# Patient Record
Sex: FEMALE | Race: WHITE | NOT HISPANIC OR LATINO | Employment: STUDENT | URBAN - METROPOLITAN AREA
[De-identification: names, ages, dates, MRNs, and addresses within clinical notes are randomized per-mention and may not be internally consistent; named-entity substitution may affect disease eponyms.]

---

## 2017-08-18 ENCOUNTER — GENERIC CONVERSION - ENCOUNTER (OUTPATIENT)
Dept: OTHER | Facility: OTHER | Age: 22
End: 2017-08-18

## 2017-08-21 ENCOUNTER — GENERIC CONVERSION - ENCOUNTER (OUTPATIENT)
Dept: OTHER | Facility: OTHER | Age: 22
End: 2017-08-21

## 2018-01-16 NOTE — MISCELLANEOUS
Message   Recorded as Task   Date: 08/21/2017 10:28 AM, Created By: System   Task Name: Rx Renew Request   Assigned To: Oral Mayorga   Regarding Patient: Reed Yoder, Status: In Progress   Comment:    System - 21 Aug 2017 10:28 AM     PHARMACY: [id number] [facility]  PATIENT: Reed Yoder  MEDICATION: BLISOVI FE 1-20 TABLET   Cristina Salmeron - 21 Aug 2017 10:34 AM     TASK REASSIGNED: Previously Assigned To Phillip Almonte - 21 Aug 2017 10:34 AM     TASK IN PROGRESS   Meghan Suarez - 21 Aug 2017 11:14 AM     TASK EDITED  Patient should have enough B/C to last her till Dec 2017  LMOM to cb       ext: Clorinda Rinne - 21 Aug 2017 11:19 AM     TASK EDITED  Patient returned call - she has birth control to last her till Dec         Active Problems    1  Concussion (850 9) (S06 0X9A)   2  Encounter for gynecological examination without abnormal finding (V72 31) (Z01 419)   3  Menorrhagia (626 2) (N92 0)   4  Menorrhagia with regular cycle (626 2) (N92 0)   5  Screening for STD (sexually transmitted disease) (V74 5) (Z11 3)    Current Meds   1  Junel FE 1/20 1-20 MG-MCG Oral Tablet; Take 1 tablet daily; Therapy: 80YNH2673 to [phone])  Requested for: 01PKN3549; Last   Rx:[id number] Ordered   2  Norethin Ace-Eth Estrad-FE 1-20 MG-MCG Oral Tablet; take one pill daily continuously   for 3 cycles, then allow a withdrawal bleed; Therapy: 02ZEA3716 to (Evaluate:70Yst0972)  Requested for: 64Ozc0662; Last   Rx:[id number] Ordered    Allergies    1   No Known Drug Allergies    Signatures   Electronically signed by : Wayne Grider, ; Aug 21 2017 11:19AM EST                       (Author)

## 2018-01-18 NOTE — MISCELLANEOUS
Message   Recorded as Task   Date: 05/24/2016 12:29 PM, Created By: Glenna Lora   Task Name: Call Back   Assigned To: Theo Pagan   Regarding Patient: Karis Han, Status: In Progress   Comment:    Adia Arevalo - 24 May 2016 12:29 PM     TASK CREATED  Caller: Chema Jacob, Mother; Other  pls call mom Arlene Hua, rx is working well for daughter & wants to know if more is available & if a appt is needed with K87,  PLS CALL mom @ [phone]   Joyce Villagran - 24 May 2016 12:39 PM     TASK IN PROGRESS   Joyce Villagran - 24 May 2016 12:39 PM     TASK EDITED  lm for mother tcb   Tina Dear - 25 May 2016 10:20 AM     TASK EDITED  Mom wants cb after 1 at [address] [place] [address] [facility] [employer] - 25 May 2016 1:09 PM     TASK REPLIED TO: Previously Assigned To Miranda Hunter Fe is working well  Can pt have a refill? Tina Dear - 25 May 2016 1:12 PM     TASK EDITED  lmtcb on Moms cell  Previous # incorrect  Call Arlene Hua at [phone]   Tina Dear - 25 May 2016 1:13 PM     TASK EDITED   Tina Dear - 25 May 2016 2:09 PM     TASK EDITED  Pts pills refilled and [facility] given [facility]   Tina Dear - 25 May 2016 2:09 PM     TASK EDITED  Pt given yly appt        Active Problems    1  Concussion (850 9) (S06 0X9A)   2  Menorrhagia (626 2) (N92 0)   3  Menorrhagia with regular cycle (626 2) (N92 0)    Current Meds   1  Junel FE 1/20 1-20 MG-MCG Oral Tablet; take 1 tablet every day; Therapy: 73CNW2191 to (Evaluate:08Mar2016); Last Rx:07Apr2015 Ordered   2  Norethin Ace-Eth Estrad-FE 1-20 MG-MCG Oral Tablet; take one pill daily continuously   for 3 cycles, then allow a withdrawal bleed; Therapy: 67IPH4394 to (Evaluate:06Oct2016); Last Rx:12Oct2015 Ordered    Allergies    1   No Known Drug Allergies    Plan  Menorrhagia with regular cycle    · Norethin Ace-Eth Estrad-FE 1-20 MG-MCG Oral Tablet; take one pill daily  continuously for 3 cycles, then allow a withdrawal bleed    Signatures Electronically signed by :  Alcon Duran, ; May 25 2016  2:09PM EST                       (Author)

## 2018-01-18 NOTE — MISCELLANEOUS
Message   Recorded as Task   Date: 08/17/2017 04:27 PM, Created By: System   Task Name: Rx Renew Request   Assigned To: Nancy Duggan   Regarding Patient: Dinorah Solis, Status: In Progress   Comment:    System - 17 Aug 2017 4:27 PM     PHARMACY: [id number]  PATIENT: Dinorah Solis  MEDICATION: BLISOVI FE 1-20 TABLET   JedCristina alvarez - 18 Aug 2017 6:59 AM     TASK REASSIGNED: Previously Assigned To Darrel Yu - 18 Aug 2017 8:49 AM     TASK IN PROGRESS   Meghan Suarez - 18 Aug 2017 8:51 AM     TASK EDITED  Patient has enough b/c to last till her yearly in Jan  She no longer uses [facility]         Active Problems    1  Concussion (850 9) (S06 0X9A)   2  Encounter for gynecological examination without abnormal finding (V72 31) (Z01 419)   3  Menorrhagia (626 2) (N92 0)   4  Menorrhagia with regular cycle (626 2) (N92 0)   5  Screening for STD (sexually transmitted disease) (V74 5) (Z11 3)    Current Meds   1  Junel FE 1/20 1-20 MG-MCG Oral Tablet; Take 1 tablet daily; Therapy: [id number] to 566 324 313)  Requested for: 29DXO8652; Last   Rx:[id number] Ordered   2  Norethin Ace-Eth Estrad-FE 1-20 MG-MCG Oral Tablet; take one pill daily continuously   for 3 cycles, then allow a withdrawal bleed; Therapy: 00ELM2567 to (Evaluate:42Thu0901)  Requested for: 11Tiu7974; Last   Rx:[id number] Ordered    Allergies    1   No Known Drug Allergies    Signatures   Electronically signed by : Coco Soto, ; Aug 18 2017  8:51AM EST                       (Author)

## 2018-05-16 DIAGNOSIS — N94.6 DYSMENORRHEA: Primary | ICD-10-CM

## 2018-05-16 RX ORDER — NORETHINDRONE ACETATE AND ETHINYL ESTRADIOL 1MG-20(21)
1 KIT ORAL DAILY
Qty: 28 TABLET | Refills: 0 | Status: SHIPPED | OUTPATIENT
Start: 2018-05-16 | End: 2018-06-10 | Stop reason: SDUPTHER

## 2018-05-16 RX ORDER — NORETHINDRONE ACETATE AND ETHINYL ESTRADIOL 1MG-20(21)
1 KIT ORAL DAILY
COMMUNITY
Start: 2015-04-07 | End: 2018-05-16 | Stop reason: SDUPTHER

## 2018-05-16 NOTE — TELEPHONE ENCOUNTER
MOTHER, JAMES, CALLING FOR HER DAUGHTER OLESYA (WHO IS AWAY AT COLLEGE)   SHE ONLY HAS 1 MONTH OF OC'S LEFT, HAS NO PROBLEMS, CAN REFILL BE CALLED IN?

## 2018-06-10 DIAGNOSIS — N94.6 DYSMENORRHEA: ICD-10-CM

## 2018-06-11 RX ORDER — NORETHINDRONE ACETATE AND ETHINYL ESTRADIOL AND FERROUS FUMARATE 1MG-20(21)
KIT ORAL
Qty: 28 TABLET | Refills: 6 | Status: SHIPPED | OUTPATIENT
Start: 2018-06-11 | End: 2018-11-21 | Stop reason: SDUPTHER

## 2018-06-12 DIAGNOSIS — N94.6 DYSMENORRHEA: ICD-10-CM

## 2018-06-12 RX ORDER — NORETHINDRONE ACETATE AND ETHINYL ESTRADIOL AND FERROUS FUMARATE 1MG-20(21)
KIT ORAL
Qty: 28 TABLET | Refills: 6 | Status: SHIPPED | OUTPATIENT
Start: 2018-06-12 | End: 2018-11-21 | Stop reason: SDUPTHER

## 2018-08-07 ENCOUNTER — TELEPHONE (OUTPATIENT)
Dept: OBGYN CLINIC | Facility: CLINIC | Age: 23
End: 2018-08-07

## 2018-08-07 NOTE — TELEPHONE ENCOUNTER
Pt needs vivelle bc a 3 mth supply,  K87, pt had to resched her yrly last week  When the power went down, [facility],  thanks

## 2018-11-21 ENCOUNTER — ANNUAL EXAM (OUTPATIENT)
Dept: OBGYN CLINIC | Facility: CLINIC | Age: 23
End: 2018-11-21
Payer: COMMERCIAL

## 2018-11-21 VITALS
BODY MASS INDEX: 22.73 KG/M2 | HEIGHT: 68 IN | SYSTOLIC BLOOD PRESSURE: 118 MMHG | WEIGHT: 150 LBS | DIASTOLIC BLOOD PRESSURE: 78 MMHG

## 2018-11-21 DIAGNOSIS — N94.6 DYSMENORRHEA: Primary | ICD-10-CM

## 2018-11-21 DIAGNOSIS — Z11.3 SCREENING FOR STD (SEXUALLY TRANSMITTED DISEASE): ICD-10-CM

## 2018-11-21 DIAGNOSIS — Z01.419 ENCOUNTER FOR GYNECOLOGICAL EXAMINATION (GENERAL) (ROUTINE) WITHOUT ABNORMAL FINDINGS: Primary | ICD-10-CM

## 2018-11-21 DIAGNOSIS — Z12.4 CERVICAL CANCER SCREENING: ICD-10-CM

## 2018-11-21 DIAGNOSIS — B37.3 YEAST VAGINITIS: ICD-10-CM

## 2018-11-21 PROBLEM — B37.31 YEAST VAGINITIS: Status: ACTIVE | Noted: 2018-11-21

## 2018-11-21 PROCEDURE — G0124 SCREEN C/V THIN LAYER BY MD: HCPCS | Performed by: PATHOLOGY

## 2018-11-21 PROCEDURE — G0145 SCR C/V CYTO,THINLAYER,RESCR: HCPCS | Performed by: PATHOLOGY

## 2018-11-21 PROCEDURE — 87624 HPV HI-RISK TYP POOLED RSLT: CPT | Performed by: OBSTETRICS & GYNECOLOGY

## 2018-11-21 PROCEDURE — S0612 ANNUAL GYNECOLOGICAL EXAMINA: HCPCS | Performed by: OBSTETRICS & GYNECOLOGY

## 2018-11-21 NOTE — PROGRESS NOTES
Assessment/Plan:    No problem-specific Assessment & Plan notes found for this encounter  Diagnoses and all orders for this visit:    Encounter for gynecological examination (general) (routine) without abnormal findings  -     Liquid-based pap, screening    Cervical cancer screening  -     Liquid-based pap, screening    Screening for STD (sexually transmitted disease)    Yeast vaginitis  -     terconazole (TERAZOL 3) 0 8 % vaginal cream; Insert 1 applicator into the vagina daily at bedtime    Other orders  -     Cancel: Liquid-based pap, screening  -     Cancel: Chlamydia/GC amplified DNA by PCR        Annual examination was completed  Pap with reflex HPV was obtained  Prescription for terconazole was sent  Patient to return to the office in 1 year or as necessary  She did declined GC Chlamydia cultures today  Subjective:      Patient ID: Ray Ray is a 26 y o  female  Patient returns for annual gyn visit  She has no new medical surgical issues  She uses OCPs on a quarterly basis and allows withdrawal bleed that is minimal in flow  She continues in a monogamous relationship with a boyfriend of the last 2 years  Gynecologic Exam         The following portions of the patient's history were reviewed and updated as appropriate:   She  has no past medical history on file  She   Patient Active Problem List    Diagnosis Date Noted    Encounter for gynecological examination (general) (routine) without abnormal findings 11/21/2018    Cervical cancer screening 11/21/2018    Screening for STD (sexually transmitted disease) 11/21/2018    Yeast vaginitis 11/21/2018     She  has no past surgical history on file  Her family history includes Pancreatic cancer in her paternal grandmother  She  reports that she has never smoked  She has never used smokeless tobacco  She reports that she drinks alcohol  She reports that she does not use drugs    Current Outpatient Prescriptions   Medication Sig Dispense Refill    JUNEL FE 1/20 1-20 MG-MCG per tablet TAKE 1 TABLET EVERY DAY 28 tablet 6    terconazole (TERAZOL 3) 0 8 % vaginal cream Insert 1 applicator into the vagina daily at bedtime 20 g 1     No current facility-administered medications for this visit  Current Outpatient Prescriptions on File Prior to Visit   Medication Sig    JUNEL FE 1/20 1-20 MG-MCG per tablet TAKE 1 TABLET EVERY DAY    [DISCONTINUED] JUNEL FE 1/20 1-20 MG-MCG per tablet TAKE 1 TABLET EVERY DAY     No current facility-administered medications on file prior to visit  She has No Known Allergies       Review of Systems   All other systems reviewed and are negative  Objective:      /78 (BP Location: Left arm, Patient Position: Sitting)   Ht 5' 8" (1 727 m)   Wt 68 kg (150 lb)   LMP 11/11/2018 (Exact Date)   Breastfeeding? No   BMI 22 81 kg/m²          Physical Exam   Constitutional: She is oriented to person, place, and time  She appears well-developed and well-nourished  HENT:   Head: Normocephalic and atraumatic  Nose: Nose normal    Eyes: Pupils are equal, round, and reactive to light  EOM are normal    Neck: Normal range of motion  Neck supple  No thyromegaly present  Cardiovascular: Normal rate and regular rhythm  Pulmonary/Chest: Effort normal and breath sounds normal  No respiratory distress  Breasts no masses, tenderness, skin changes   Abdominal: Soft  Bowel sounds are normal  She exhibits no distension and no mass  There is no tenderness  Hernia confirmed negative in the right inguinal area and confirmed negative in the left inguinal area  Genitourinary: Vagina normal and uterus normal  No breast swelling, tenderness, discharge or bleeding  Pelvic exam was performed with patient supine  No labial fusion  There is no rash, tenderness, lesion or injury on the right labia  There is no rash, tenderness, lesion or injury on the left labia   Cervix exhibits no motion tenderness, no discharge and no friability  Genitourinary Comments: Ext genitalia nl female w/o lesions  Vag healthy;   thick white adherent discharge noted with mild erythema no other lesions noted  Cervix healthy w/o lesions or discharge  uterus nl size, NT, no mass  Adnexa NT, no mass   Musculoskeletal: Normal range of motion  She exhibits no edema  Lymphadenopathy:        Right: No inguinal adenopathy present  Left: No inguinal adenopathy present  Neurological: She is alert and oriented to person, place, and time  She has normal reflexes  Skin: Skin is warm and dry  No rash noted  Psychiatric: She has a normal mood and affect  Her behavior is normal  Thought content normal    Nursing note and vitals reviewed

## 2018-11-23 RX ORDER — NORETHINDRONE ACETATE AND ETHINYL ESTRADIOL 1MG-20(21)
1 KIT ORAL DAILY
Qty: 90 TABLET | Refills: 1 | Status: SHIPPED | OUTPATIENT
Start: 2018-11-23 | End: 2019-05-06 | Stop reason: SDUPTHER

## 2018-12-03 LAB
LAB AP GYN PRIMARY INTERPRETATION: NORMAL
LAB AP LMP: NORMAL
Lab: NORMAL
PATH INTERP SPEC-IMP: NORMAL

## 2018-12-04 LAB
HPV HR 12 DNA CVX QL NAA+PROBE: NEGATIVE
HPV16 DNA CVX QL NAA+PROBE: NEGATIVE
HPV18 DNA CVX QL NAA+PROBE: NEGATIVE

## 2019-05-06 DIAGNOSIS — N94.6 DYSMENORRHEA: ICD-10-CM

## 2019-05-06 RX ORDER — NORETHINDRONE ACETATE AND ETHINYL ESTRADIOL AND FERROUS FUMARATE 1MG-20(21)
KIT ORAL
Qty: 84 TABLET | Refills: 1 | Status: SHIPPED | OUTPATIENT
Start: 2019-05-06 | End: 2019-10-10 | Stop reason: SDUPTHER

## 2019-07-07 ENCOUNTER — APPOINTMENT (OUTPATIENT)
Dept: RADIOLOGY | Facility: CLINIC | Age: 24
End: 2019-07-07
Payer: COMMERCIAL

## 2019-07-07 ENCOUNTER — OFFICE VISIT (OUTPATIENT)
Dept: URGENT CARE | Facility: CLINIC | Age: 24
End: 2019-07-07
Payer: COMMERCIAL

## 2019-07-07 VITALS
SYSTOLIC BLOOD PRESSURE: 118 MMHG | OXYGEN SATURATION: 98 % | HEIGHT: 68 IN | RESPIRATION RATE: 16 BRPM | WEIGHT: 150 LBS | TEMPERATURE: 99 F | HEART RATE: 85 BPM | BODY MASS INDEX: 22.73 KG/M2 | DIASTOLIC BLOOD PRESSURE: 80 MMHG

## 2019-07-07 DIAGNOSIS — M25.471 PAIN AND SWELLING OF ANKLE, RIGHT: ICD-10-CM

## 2019-07-07 DIAGNOSIS — M25.571 PAIN AND SWELLING OF ANKLE, RIGHT: ICD-10-CM

## 2019-07-07 DIAGNOSIS — M25.471 PAIN AND SWELLING OF ANKLE, RIGHT: Primary | ICD-10-CM

## 2019-07-07 DIAGNOSIS — L03.90 CELLULITIS, UNSPECIFIED CELLULITIS SITE: ICD-10-CM

## 2019-07-07 DIAGNOSIS — M25.571 PAIN AND SWELLING OF ANKLE, RIGHT: Primary | ICD-10-CM

## 2019-07-07 PROCEDURE — 73610 X-RAY EXAM OF ANKLE: CPT

## 2019-07-07 PROCEDURE — 99213 OFFICE O/P EST LOW 20 MIN: CPT | Performed by: NURSE PRACTITIONER

## 2019-07-07 RX ORDER — CEPHALEXIN 500 MG/1
500 CAPSULE ORAL 3 TIMES DAILY
Qty: 30 CAPSULE | Refills: 0
Start: 2019-07-07 | End: 2019-07-17

## 2019-07-07 NOTE — PATIENT INSTRUCTIONS
Cellulitis   WHAT YOU NEED TO KNOW:   Cellulitis is a skin infection caused by bacteria  Cellulitis may go away on its own or you may need treatment  Your healthcare provider may draw a [place] around the outside edges of your cellulitis  If your cellulitis spreads, your healthcare provider will see it outside of the [place]  DISCHARGE INSTRUCTIONS:   Call 911 if:   · You have sudden trouble breathing or chest pain  Return to the emergency department if:   · Your wound gets larger and more painful  · You feel a crackling under your skin when you touch it  · You have purple dots or bumps on your skin, or you see bleeding under your skin  · You have new swelling and pain in your legs  · The red, warm, swollen area gets larger  · You see red streaks coming from the infected area  Contact your healthcare provider if:   · You have a fever  · Your fever or pain does not go away or gets worse  · The area does not get smaller after 2 days of antibiotics  · Your skin is flaking or peeling off  · You have questions or concerns about your condition or care  Medicines:   · Antibiotics  help treat the bacterial infection  · NSAIDs , such as ibuprofen, help decrease swelling, pain, and fever  NSAIDs can cause stomach bleeding or kidney problems in certain people  If you take blood thinner medicine, always ask if NSAIDs are safe for you  Always read the medicine label and follow directions  Do not give these medicines to children under 10months of age without direction from your child's healthcare provider  · Acetaminophen  decreases pain and fever  It is available without a doctor's order  Ask how much to take and how often to take it  Follow directions  Read the labels of all other medicines you are using to see if they also contain acetaminophen, or ask your doctor or pharmacist  Acetaminophen can cause liver damage if not taken correctly   Do not use more than 4 grams (4,000 milligrams) total of acetaminophen in one day  · Take your medicine as directed  Contact your healthcare provider if you think your medicine is not helping or if you have side effects  Tell him or her if you are allergic to any medicine  Keep a list of the medicines, vitamins, and herbs you take  Include the amounts, and when and why you take them  Bring the list or the pill bottles to follow-up visits  Carry your medicine list with you in case of an emergency  Self-care:   · Elevate the area above the level of your heart  as often as you can  This will help decrease swelling and pain  Prop the area on pillows or blankets to keep it elevated comfortably  · Clean the area daily until the wound scabs over  Gently wash the area with soap and water  Pat dry  Use dressings as directed  · Place cool or warm, wet cloths on the area as directed  Use clean cloths and clean water  Leave it on the area until the cloth is room temperature  Pat the area dry with a clean, dry cloth  The cloths may help decrease pain  Prevent cellulitis:   · Do not scratch bug bites or areas of injury  You increase your risk for cellulitis by scratching these areas  · Do not share personal items, such as towels, clothing, and razors  · Clean exercise equipment  with germ-killing  before and after you use it  · Wash your hands often  Use soap and water  Wash your hands after you use the bathroom, change a child's diapers, or sneeze  Wash your hands before you prepare or eat food  Use lotion to prevent dry, cracked skin  · Wear pressure stockings as directed  You may be told to wear the stockings if you have peripheral edema  The stockings improve blood flow and decrease swelling  · Treat athlete's foot  This can help prevent the spread of a bacterial skin infection  Follow up with your healthcare provider within 3 days, or as directed:   Your healthcare provider will check if your cellulitis is getting better  You may need different medicine  Write down your questions so you remember to ask them during your visits  © 2017 [address] Information is for End User's use only and may not be sold, redistributed or otherwise used for commercial purposes  All illustrations and images included in CareNotes® are the copyrighted property of A [employer] M , Inc  or [employer]  The above information is an  only  It is not intended as medical advice for individual conditions or treatments  Talk to your doctor, nurse or pharmacist before following any medical regimen to see if it is safe and effective for you

## 2019-07-07 NOTE — PROGRESS NOTES
[facility]        NAME: Juan C Hdz is a 27 y o  female  : 1995    MRN: [id number]  DATE: 2019  TIME: 4:58 PM    Assessment and Plan   Pain and swelling of ankle, right [M25 571, M25 471]  1  Pain and swelling of ankle, right  XR ankle 3+ vw right   2  Cellulitis, unspecified cellulitis site  cephalexin (KEFLEX) 500 mg capsule         Patient Instructions     Take meds as directed  Tylenol for pain prn  Cephalexin TID x 10 days  Follow up with PCP in 3-5 days  Proceed to  ER if symptoms worsen  Chief Complaint     Chief Complaint   Patient presents with    Ankle Pain     Right ankle swollen, pain with weightbearing  Started friday morning  Denies injury  Warm to touch         History of Present Illness       HPI   Accompanied by her mother  Reports pain to the right ankle  Started several days ago and has slowly gotten worse  Denies any known trauma  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  Denies previous problems with the ankle  No fever  Review of Systems   Review of Systems   Musculoskeletal: Positive for gait problem (b/c of right ankle pain) and joint swelling (right ankle)  Skin: Positive for color change (some discoloration of the right ankle)  Negative for rash and wound           Current Medications       Current Outpatient Medications:     cephalexin (KEFLEX) 500 mg capsule, Take 1 capsule (500 mg total) by mouth 3 (three) times a day for 10 days, Disp: 30 capsule, Rfl: 0    JUNEL FE 1/20 1-20 MG-MCG per tablet, TAKE 1 TABLET BY MOUTH EVERY DAY, Disp: 84 tablet, Rfl: 1    terconazole (TERAZOL 3) 0 8 % vaginal cream, Insert 1 applicator into the vagina daily at bedtime, Disp: 20 g, Rfl: 1    Current Allergies     Allergies as of 2019    (No Known Allergies)            The following portions of the patient's history were reviewed and updated as appropriate: allergies, current medications, past family history, past medical history, past social history, past surgical history and problem list      History reviewed  No pertinent past medical history  History reviewed  No pertinent surgical history  Family History   Problem Relation Age of Onset    Pancreatic cancer Paternal Grandmother          Medications have been verified  Objective   /80   Pulse 85   Temp 99 °F (37 2 °C)   Resp 16   Ht 5' 8" (1 727 m)   Wt 68 kg (150 lb)   SpO2 98%   BMI 22 81 kg/m²        Physical Exam     Physical Exam   Skin: Skin is warm  Rash (There is erythema and rash consistent with cellulitis on the right ankle  Also severe swelling  Tenderness with palpaption  ) noted  There is erythema

## 2019-08-08 ENCOUNTER — OFFICE VISIT (OUTPATIENT)
Dept: FAMILY MEDICINE CLINIC | Facility: CLINIC | Age: 24
End: 2019-08-08
Payer: COMMERCIAL

## 2019-08-08 VITALS
DIASTOLIC BLOOD PRESSURE: 76 MMHG | BODY MASS INDEX: 22.85 KG/M2 | HEART RATE: 82 BPM | SYSTOLIC BLOOD PRESSURE: 120 MMHG | WEIGHT: 150.8 LBS | HEIGHT: 68 IN | RESPIRATION RATE: 18 BRPM

## 2019-08-08 DIAGNOSIS — Z23 NEED FOR TDAP VACCINATION: ICD-10-CM

## 2019-08-08 DIAGNOSIS — Z00.00 PE (PHYSICAL EXAM), ANNUAL: Primary | ICD-10-CM

## 2019-08-08 PROCEDURE — 99395 PREV VISIT EST AGE 18-39: CPT | Performed by: NURSE PRACTITIONER

## 2019-08-08 PROCEDURE — 90715 TDAP VACCINE 7 YRS/> IM: CPT

## 2019-08-08 PROCEDURE — 90471 IMMUNIZATION ADMIN: CPT

## 2019-08-08 NOTE — PROGRESS NOTES
Beatris Bishop is a 27 y o   female and is here for routine health maintenance  The patient reports no problems  Cancer Screening  Colononoscopy N/A  Mammogram N/A  Pap done 11/21/2018  Abnormal pap? no  Smoker never        Immunization History   Administered Date(s) Administered    DTaP,unspecified 1997, 1997, 01/08/1998, 01/06/1999, 07/09/2002    HPV 07/13/2016, 08/16/2016, 01/17/2017    Hep A, ped/adol, 2 dose 08/25/2009, 08/02/2010    Hep B, Adolescent or Pediatric 1995, 1995, 01/08/1998    HiB 1995, 1995, 01/08/1998, 10/08/1998    IPV 1995, 1997, 07/09/1998, 07/09/2002    MMR 10/08/1998, 07/10/2001    Meningococcal ACWY, unspecified 08/25/2008, 06/30/2014    Meningococcal B, Recombinant 07/13/2016, 08/16/2016, 01/17/2017    Tdap 08/25/2008    Tuberculin Skin Test-PPD Intradermal 08/05/2015, 07/19/2017, 06/12/2018    Varicella 01/06/1999, 08/25/2008           Dentist Visit  within 6-12 months      The following portions of the patient's history were reviewed and updated as appropriate: allergies, current medications, past family history, past medical history, past social history, past surgical history and problem list       Review of Systems  Review of Systems   Constitutional: Negative  HENT: Negative  Eyes: Negative  Respiratory: Negative  Cardiovascular: Negative  Gastrointestinal: Negative  Endocrine: Negative  Genitourinary: Negative  Musculoskeletal: Negative  Skin: Negative  Allergic/Immunologic: Negative  Neurological: Negative  Hematological: Negative  Psychiatric/Behavioral: Negative            Objective   Vitals:    08/08/19 0953   BP: 120/76   Pulse: 82   Resp: 18     Wt Readings from Last 3 Encounters:   08/08/19 68 4 kg (150 lb 12 8 oz)   07/07/19 68 kg (150 lb)   11/21/18 68 kg (150 lb)     BP Readings from Last 3 Encounters:   08/08/19 120/76   07/07/19 118/80   11/21/18 118/78 Pulse Readings from Last 3 Encounters:   08/08/19 82   07/07/19 85   04/02/14 66     BMI Readings from Last 3 Encounters:   08/08/19 22 93 kg/m²   07/07/19 22 81 kg/m²   11/21/18 22 81 kg/m²     Physical Exam   Constitutional: She is oriented to person, place, and time  She appears well-developed and well-nourished  HENT:   Head: Normocephalic  Eyes: Pupils are equal, round, and reactive to light  Neck: Normal range of motion  Neck supple  Cardiovascular: Normal rate and regular rhythm  Pulmonary/Chest: Effort normal and breath sounds normal    Abdominal: Soft  Bowel sounds are normal    Musculoskeletal: Normal range of motion  Neurological: She is alert and oriented to person, place, and time  Skin: Skin is warm  Psychiatric: She has a normal mood and affect  Her behavior is normal  Judgment and thought content normal        Assessment/Plan     Healthy female exam     1  PE: conducted and form completed  2  Patient Counseling:  --Nutrition: Stressed importance of moderation in sodium/caffeine intake, saturated fat and cholesterol, caloric balance, sufficient intake of fresh fruits, vegetables, fiber, calcium, iron  --Exercise: Stressed the importance of regular exercise  --Injury prevention: Discussed safety belts, safety helmets, smoke detector, smoking near bedding or upholstery  --Dental health: Discussed importance of regular tooth brushing, flossing, and dental visits  --Immunizations reviewed  --Discussed benefits of screening colonoscopy  --After hours service discussed with patient    3  Cancer Screening pap is UTD  4  Labs not indicated  5  TDaP updated today   6  Follow up in one year

## 2019-10-10 DIAGNOSIS — N94.6 DYSMENORRHEA: ICD-10-CM

## 2019-10-10 RX ORDER — NORETHINDRONE ACETATE AND ETHINYL ESTRADIOL AND FERROUS FUMARATE 1MG-20(21)
KIT ORAL
Qty: 84 TABLET | Refills: 1 | Status: SHIPPED | OUTPATIENT
Start: 2019-10-10 | End: 2020-04-08 | Stop reason: SDUPTHER

## 2019-12-02 ENCOUNTER — ANNUAL EXAM (OUTPATIENT)
Dept: OBGYN CLINIC | Facility: CLINIC | Age: 24
End: 2019-12-02
Payer: COMMERCIAL

## 2019-12-02 VITALS
DIASTOLIC BLOOD PRESSURE: 60 MMHG | WEIGHT: 147.8 LBS | SYSTOLIC BLOOD PRESSURE: 112 MMHG | HEIGHT: 69 IN | BODY MASS INDEX: 21.89 KG/M2

## 2019-12-02 DIAGNOSIS — B37.3 YEAST VAGINITIS: ICD-10-CM

## 2019-12-02 DIAGNOSIS — Z01.419 ENCOUNTER FOR GYNECOLOGICAL EXAMINATION WITHOUT ABNORMAL FINDING: Primary | ICD-10-CM

## 2019-12-02 PROCEDURE — S0612 ANNUAL GYNECOLOGICAL EXAMINA: HCPCS | Performed by: OBSTETRICS & GYNECOLOGY

## 2019-12-02 NOTE — PROGRESS NOTES
Assessment/Plan:    No problem-specific Assessment & Plan notes found for this encounter  Diagnoses and all orders for this visit:    Yeast vaginitis    Encounter for gynecological examination without abnormal finding        Annual examination was completed  Patient declined STD cultures given she is in a 3 year relationship and has no worries  Patient has been asymptomatic despite the evidence of yeast vaginitis today  We discussed strategies to reduce risk  A prescription for terconazole was forwarded to her pharmacy  Patient may continue to trial different regimens with her OCP  We did discuss allowing withdrawal bleed at a 4 or 6 month interval patient will call if she does so so that I may adjust her instructions to the pharmacist   Patient otherwise return to the office in 1 year  Subjective:      Patient ID: Rose Tamayo is a 27 y o  female  Patient returns for annual gyn visit  Since she was last seen she has no new medical surgical issues  She has been using OCPs to allow a quarterly withdrawal bleed and has been doing quite well with this  She has menses that last 4-5 days using 3-4 pads or tampons on her heaviest day of flow  She remains in her long-term relationship and is sexually active  She has no complaints  She is a  in [place]      The following portions of the patient's history were reviewed and updated as appropriate:   She  has no past medical history on file  She   Patient Active Problem List    Diagnosis Date Noted    Encounter for gynecological examination without abnormal finding 12/02/2019    Encounter for gynecological examination (general) (routine) without abnormal findings 11/21/2018    Cervical cancer screening 11/21/2018    Screening for STD (sexually transmitted disease) 11/21/2018    Yeast vaginitis 11/21/2018     She  has no past surgical history on file    Her family history includes No Known Problems in her father and mother; Pancreatic cancer in her paternal grandmother  She  reports that she has never smoked  She has never used smokeless tobacco  She reports that she drinks alcohol  She reports that she does not use drugs  Current Outpatient Medications   Medication Sig Dispense Refill    JUNEL FE 1/20 1-20 MG-MCG per tablet TAKE 1 TABLET BY MOUTH EVERY DAY 84 tablet 1     No current facility-administered medications for this visit  Current Outpatient Medications on File Prior to Visit   Medication Sig    JUNEL FE 1/20 1-20 MG-MCG per tablet TAKE 1 TABLET BY MOUTH EVERY DAY     No current facility-administered medications on file prior to visit  She has No Known Allergies       Review of Systems   All other systems reviewed and are negative  Objective:      /60 (BP Location: Left arm, Patient Position: Sitting, Cuff Size: Standard)   Ht 5' 8 5" (1 74 m)   Wt 67 kg (147 lb 12 8 oz)   LMP 11/17/2019   BMI 22 15 kg/m²          Physical Exam   Constitutional: She is oriented to person, place, and time  She appears well-developed and well-nourished  HENT:   Head: Normocephalic and atraumatic  Nose: Nose normal    Eyes: Pupils are equal, round, and reactive to light  EOM are normal    Neck: Normal range of motion  Neck supple  No thyromegaly present  Pulmonary/Chest: Effort normal  No respiratory distress  No breast tenderness, discharge or bleeding  Breasts no masses, tenderness, skin changes   Abdominal: Soft  She exhibits no distension and no mass  There is no tenderness  Hernia confirmed negative in the right inguinal area and confirmed negative in the left inguinal area  Genitourinary: Vagina normal and uterus normal  No breast tenderness, discharge or bleeding  Pelvic exam was performed with patient supine  No labial fusion  There is no rash, tenderness, lesion or injury on the right labia  There is no rash, tenderness, lesion or injury on the left labia   Cervix exhibits no motion tenderness, no discharge and no friability  Genitourinary Comments: Ext genitalia nl female w/o lesions  Vag healthy without lesions; moderate thick yellow white adherent discharge with mild erythema noted  Cervix healthy w/o lesions or discharge  uterus nl size, NT, no mass  Adnexa NT, no mass   Musculoskeletal: Normal range of motion  She exhibits no edema  Lymphadenopathy:        Right: No inguinal adenopathy present  Left: No inguinal adenopathy present  Neurological: She is alert and oriented to person, place, and time  She has normal reflexes  Skin: Skin is warm and dry  No rash noted  Psychiatric: She has a normal mood and affect  Her behavior is normal  Thought content normal    Nursing note and vitals reviewed

## 2020-04-08 ENCOUNTER — TELEPHONE (OUTPATIENT)
Dept: OBGYN CLINIC | Facility: CLINIC | Age: 25
End: 2020-04-08

## 2020-04-08 DIAGNOSIS — N94.6 DYSMENORRHEA: ICD-10-CM

## 2020-04-08 RX ORDER — NORETHINDRONE ACETATE AND ETHINYL ESTRADIOL 1MG-20(21)
1 KIT ORAL DAILY
Qty: 84 TABLET | Refills: 0 | Status: SHIPPED | OUTPATIENT
Start: 2020-04-08 | End: 2020-06-29 | Stop reason: SDUPTHER

## 2020-04-13 ENCOUNTER — TELEPHONE (OUTPATIENT)
Dept: OBGYN CLINIC | Facility: CLINIC | Age: 25
End: 2020-04-13

## 2020-06-29 DIAGNOSIS — N94.6 DYSMENORRHEA: Primary | ICD-10-CM

## 2020-06-29 RX ORDER — NORETHINDRONE ACETATE AND ETHINYL ESTRADIOL 1MG-20(21)
1 KIT ORAL DAILY
Qty: 84 TABLET | Refills: 1 | Status: SHIPPED | OUTPATIENT
Start: 2020-06-29 | End: 2020-12-04

## 2020-09-27 ENCOUNTER — APPOINTMENT (OUTPATIENT)
Dept: RADIOLOGY | Facility: CLINIC | Age: 25
End: 2020-09-27
Payer: COMMERCIAL

## 2020-09-27 ENCOUNTER — OFFICE VISIT (OUTPATIENT)
Dept: URGENT CARE | Facility: CLINIC | Age: 25
End: 2020-09-27
Payer: COMMERCIAL

## 2020-09-27 VITALS
HEIGHT: 69 IN | SYSTOLIC BLOOD PRESSURE: 116 MMHG | DIASTOLIC BLOOD PRESSURE: 81 MMHG | HEART RATE: 91 BPM | RESPIRATION RATE: 16 BRPM | BODY MASS INDEX: 23.4 KG/M2 | WEIGHT: 158 LBS | OXYGEN SATURATION: 97 % | TEMPERATURE: 98.8 F

## 2020-09-27 DIAGNOSIS — T14.8XXA ANIMAL BITE: ICD-10-CM

## 2020-09-27 DIAGNOSIS — S61.012A LACERATION OF LEFT THUMB WITHOUT DAMAGE TO NAIL, FOREIGN BODY PRESENCE UNSPECIFIED, INITIAL ENCOUNTER: Primary | ICD-10-CM

## 2020-09-27 PROCEDURE — 90715 TDAP VACCINE 7 YRS/> IM: CPT

## 2020-09-27 PROCEDURE — 73140 X-RAY EXAM OF FINGER(S): CPT

## 2020-09-27 PROCEDURE — 90715 TDAP VACCINE 7 YRS/> IM: CPT | Performed by: PHYSICIAN ASSISTANT

## 2020-09-27 PROCEDURE — 99213 OFFICE O/P EST LOW 20 MIN: CPT | Performed by: PHYSICIAN ASSISTANT

## 2020-09-27 PROCEDURE — 90471 IMMUNIZATION ADMIN: CPT | Performed by: PHYSICIAN ASSISTANT

## 2020-09-27 RX ORDER — AMOXICILLIN AND CLAVULANATE POTASSIUM 875; 125 MG/1; MG/1
1 TABLET, FILM COATED ORAL EVERY 12 HOURS SCHEDULED
Qty: 14 TABLET | Refills: 0 | Status: SHIPPED | OUTPATIENT
Start: 2020-09-27 | End: 2020-10-04

## 2020-12-04 DIAGNOSIS — N94.6 DYSMENORRHEA: ICD-10-CM

## 2020-12-04 DIAGNOSIS — N94.6 DYSMENORRHEA: Primary | ICD-10-CM

## 2020-12-04 RX ORDER — NORETHINDRONE ACETATE AND ETHINYL ESTRADIOL 1MG-20(21)
1 KIT ORAL DAILY
Qty: 30 TABLET | Refills: 0 | Status: SHIPPED | OUTPATIENT
Start: 2020-12-04 | End: 2020-12-29 | Stop reason: SDUPTHER

## 2020-12-04 RX ORDER — NORETHINDRONE ACETATE AND ETHINYL ESTRADIOL AND FERROUS FUMARATE 1MG-20(21)
KIT ORAL
Qty: 84 TABLET | Refills: 1 | Status: SHIPPED | OUTPATIENT
Start: 2020-12-04 | End: 2020-12-29 | Stop reason: SDUPTHER

## 2020-12-29 ENCOUNTER — ANNUAL EXAM (OUTPATIENT)
Dept: OBGYN CLINIC | Facility: CLINIC | Age: 25
End: 2020-12-29
Payer: COMMERCIAL

## 2020-12-29 VITALS
DIASTOLIC BLOOD PRESSURE: 62 MMHG | HEIGHT: 69 IN | SYSTOLIC BLOOD PRESSURE: 116 MMHG | WEIGHT: 162 LBS | BODY MASS INDEX: 23.99 KG/M2

## 2020-12-29 DIAGNOSIS — N94.6 DYSMENORRHEA: ICD-10-CM

## 2020-12-29 DIAGNOSIS — Z01.419 ENCNTR FOR GYN EXAM (GENERAL) (ROUTINE) W/O ABN FINDINGS: ICD-10-CM

## 2020-12-29 PROBLEM — B37.3 YEAST VAGINITIS: Status: RESOLVED | Noted: 2018-11-21 | Resolved: 2020-12-29

## 2020-12-29 PROBLEM — Z11.3 SCREENING FOR STD (SEXUALLY TRANSMITTED DISEASE): Status: RESOLVED | Noted: 2018-11-21 | Resolved: 2020-12-29

## 2020-12-29 PROBLEM — B37.31 YEAST VAGINITIS: Status: RESOLVED | Noted: 2018-11-21 | Resolved: 2020-12-29

## 2020-12-29 PROBLEM — Z12.4 CERVICAL CANCER SCREENING: Status: RESOLVED | Noted: 2018-11-21 | Resolved: 2020-12-29

## 2020-12-29 PROCEDURE — S0612 ANNUAL GYNECOLOGICAL EXAMINA: HCPCS | Performed by: PHYSICIAN ASSISTANT

## 2020-12-29 PROCEDURE — G0145 SCR C/V CYTO,THINLAYER,RESCR: HCPCS | Performed by: PATHOLOGY

## 2020-12-29 PROCEDURE — G0124 SCREEN C/V THIN LAYER BY MD: HCPCS | Performed by: PATHOLOGY

## 2020-12-29 RX ORDER — NORETHINDRONE ACETATE AND ETHINYL ESTRADIOL 1MG-20(21)
1 KIT ORAL DAILY
Qty: 90 TABLET | Refills: 3 | Status: SHIPPED | OUTPATIENT
Start: 2020-12-29 | End: 2021-12-22 | Stop reason: SDUPTHER

## 2021-01-04 LAB
LAB AP GYN PRIMARY INTERPRETATION: NORMAL
Lab: NORMAL
PATH INTERP SPEC-IMP: NORMAL

## 2021-12-22 ENCOUNTER — TELEPHONE (OUTPATIENT)
Dept: OBGYN CLINIC | Facility: CLINIC | Age: 26
End: 2021-12-22

## 2021-12-22 DIAGNOSIS — N94.6 DYSMENORRHEA: ICD-10-CM

## 2021-12-22 RX ORDER — NORETHINDRONE ACETATE AND ETHINYL ESTRADIOL 1MG-20(21)
1 KIT ORAL DAILY
Qty: 90 TABLET | Refills: 0 | Status: SHIPPED | OUTPATIENT
Start: 2021-12-22 | End: 2022-03-04 | Stop reason: SDUPTHER

## 2022-01-05 ENCOUNTER — ANNUAL EXAM (OUTPATIENT)
Dept: OBGYN CLINIC | Facility: CLINIC | Age: 27
End: 2022-01-05
Payer: COMMERCIAL

## 2022-01-05 VITALS
BODY MASS INDEX: 23.04 KG/M2 | SYSTOLIC BLOOD PRESSURE: 120 MMHG | WEIGHT: 152 LBS | HEIGHT: 68 IN | DIASTOLIC BLOOD PRESSURE: 62 MMHG

## 2022-01-05 DIAGNOSIS — N94.6 DYSMENORRHEA: ICD-10-CM

## 2022-01-05 DIAGNOSIS — Z01.419 ENCNTR FOR GYN EXAM (GENERAL) (ROUTINE) W/O ABN FINDINGS: Primary | ICD-10-CM

## 2022-01-05 PROCEDURE — S0612 ANNUAL GYNECOLOGICAL EXAMINA: HCPCS | Performed by: PHYSICIAN ASSISTANT

## 2022-01-05 NOTE — PROGRESS NOTES
Mahad Ardon  1995    CC:  Yearly exam    S:  27 y o  female here for yearly exam      Sexual activity: She is sexually active without pain, bleeding or dryness  Contraception:  She uses Junel Fe 1/20 for contraception  She cycles this to get a bleed every 3 months  STD testing:  She does not request STD testing today  Gardasil:  She has had the Gardasil series  We reviewed [facility] guidelines for Pap testing  She got engaged in September; they are planning about a two year engagement and have no date set       She is a     Last Pap 12/29/2020 neg    Family hx of breast cancer: no  Family hx of ovarian cancer: no  Family hx of colon cancer: no      Current Outpatient Medications:     norethindrone-ethinyl estradiol (Junel FE 1/20) 1-20 MG-MCG per tablet, Take 1 tablet by mouth daily, Disp: 90 tablet, Rfl: 0  Social History     Socioeconomic History    Marital status: Single     Spouse name: Not on file    Number of children: Not on file    Years of education: Not on file    Highest education level: Not on file   Occupational History    Not on file   Tobacco Use    Smoking status: Never Smoker    Smokeless tobacco: Never Used   Vaping Use    Vaping Use: Never used   Substance and Sexual Activity    Alcohol use: Yes     Comment: occasional    Drug use: No    Sexual activity: Yes     Partners: Male     Birth control/protection: Pill   Other Topics Concern    Not on file   Social History Narrative    Activities: Volleyball    Never drank alcohol - As per [employer]      Social Determinants of Health     Financial Resource Strain: Not on file   Food Insecurity: Not on file   Transportation Needs: Not on file   Physical Activity: Not on file   Stress: Not on file   Social Connections: Not on file   Intimate Partner Violence: Not on file   Housing Stability: Not on file     Family History   Problem Relation Age of Onset    Pancreatic cancer Paternal Grandmother    Cirilo Escalante No Known Problems Mother     No Known Problems Father     Alcohol abuse Neg Hx     Substance Abuse Neg Hx     Mental illness Neg Hx      History reviewed  No pertinent past medical history  Review of Systems   Respiratory: Negative  Cardiovascular: Negative  Gastrointestinal: Negative for constipation and diarrhea  Genitourinary: Negative for difficulty urinating, pelvic pain, vaginal bleeding, vaginal discharge, itching or odor  O:  Blood pressure 120/62, height 5' 7 72" (1 72 m), weight 68 9 kg (152 lb), last menstrual period 12/29/2021, not currently breastfeeding  Patient appears well and is not in distress  Neck is supple without masses  Breasts are symmetrical without mass, tenderness, nipple discharge, skin changes or adenopathy  Abdomen is soft and nontender without masses  External genitals are normal without lesions or rashes  Urethra and urethral meatus are normal  Bladder is normal to palpation  Vagina is normal without discharge or bleeding  Cervix is normal without discharge or lesion  Uterus is normal, mobile, nontender without palpable mass  Adnexa are normal, nontender, without palpable mass  A:  Yearly exam      P:   Pap 2023   She will call when she needs refills on Junel Fe 1/20    RTO one year for yearly exam or sooner as needed

## 2022-03-04 DIAGNOSIS — N94.6 DYSMENORRHEA: ICD-10-CM

## 2022-03-04 RX ORDER — NORETHINDRONE ACETATE AND ETHINYL ESTRADIOL 1MG-20(21)
1 KIT ORAL DAILY
Qty: 90 TABLET | Refills: 3 | Status: SHIPPED | OUTPATIENT
Start: 2022-03-04 | End: 2022-06-04

## 2022-03-04 NOTE — TELEPHONE ENCOUNTER
Patient is calling for b/c refill  Last annual 1/5/22 with Brayan Weinstein last sent in 12/2021 90 pills with 0 refills

## 2022-11-21 ENCOUNTER — TELEPHONE (OUTPATIENT)
Dept: OBGYN CLINIC | Facility: CLINIC | Age: 27
End: 2022-11-21

## 2022-11-21 DIAGNOSIS — N94.6 DYSMENORRHEA: ICD-10-CM

## 2022-11-25 RX ORDER — NORETHINDRONE ACETATE AND ETHINYL ESTRADIOL 1MG-20(21)
1 KIT ORAL DAILY
Qty: 90 TABLET | Refills: 0 | Status: SHIPPED | OUTPATIENT
Start: 2022-11-25 | End: 2022-11-28 | Stop reason: SDUPTHER

## 2022-11-28 RX ORDER — NORETHINDRONE ACETATE AND ETHINYL ESTRADIOL 1MG-20(21)
KIT ORAL
Qty: 90 TABLET | Refills: 0 | Status: SHIPPED | OUTPATIENT
Start: 2022-11-28

## 2022-11-28 NOTE — TELEPHONE ENCOUNTER
Pharmacy not refilling meds - pt needs pills by next week but pharm sent message that she must wait til 12/23  - is there anything we can do about this?

## 2023-02-14 DIAGNOSIS — N94.6 DYSMENORRHEA: ICD-10-CM

## 2023-02-14 RX ORDER — NORETHINDRONE ACETATE AND ETHINYL ESTRADIOL 1MG-20(21)
KIT ORAL
Qty: 28 TABLET | Refills: 0 | Status: SHIPPED | OUTPATIENT
Start: 2023-02-14 | End: 2023-02-17 | Stop reason: SDUPTHER

## 2023-02-16 NOTE — PROGRESS NOTES
Assessment   24 y o  Anjel Hernandez presenting for annual exam      Plan   Diagnoses and all orders for this visit:    Encounter for gynecological examination (general) (routine) without abnormal findings  -     Liquid-based pap, screening    Dysmenorrhea  -     norethindrone-ethinyl estradiol (Junel FE 1/20) 1-20 MG-MCG per tablet; TAKE 1 TABLET BY MOUTH EVERY DAY - TAKE CONTINUOUSLY, SKIP PLACEBOS        Pap collected today  Contraception- taking OCPs continuously and allowing for menses every 3-4 months  Normotensive, no side effects on OCP  No VTE risk factors  Refills sent      SBE encouraged, A yearly mammogram is recommended for breast cancer screening starting at age 36  ASCCP guidelines reviewed  Advised to call with any issues, all concerns & questions addressed  See provided information in your after visit summary     F/U Annually and PRN    Results will be released to [employer], if abnormal will call or message to review and discuss treatment plan      __________________________________________________________________    Subjective     Jordin Elliott is a 24 y o  Anjel Hernandez presenting for annual exam  She is without complaint and does not want STD testing today  SCREENING  Last Pap: 2020 neg      GYN  Using OCPs continuously and allowing for menses every 3-4 months  Sexually active: Yes - single partner - male  Contraception: OCPs    Hx Abnormal pap: denies  We reviewed ASCCP guidelines for Pap testing today  Gardasil: She has completed the Gardasil series  Denies vaginal discharge, itching, odor, dyspareunia, pelvic pain and vulvar/vaginal symptoms      OB           Complaints: denies   Denies urgency, frequency, hematuria, leakage / change in stream, difficulty urinating         BREAST  Complaints: denies   Denies: breast lump, breast tenderness, nipple discharge, skin color change, and skin lesion(s)      Pertinent Family Hx:   Family hx of breast cancer: no  Family hx of ovarian cancer: no  Family hx of colon cancer: no      GENERAL  PMH reviewed/updated and is as below  Patient does follow with a PCP  SOCIAL  Smoking: no  Alcohol:occasional  Drug: no  Occupation:        No past medical history on file  Past Surgical History:   Procedure Laterality Date   • WISDOM TOOTH EXTRACTION           Current Outpatient Medications:   •  norethindrone-ethinyl estradiol (Junel FE 1/20) 1-20 MG-MCG per tablet, TAKE 1 TABLET BY MOUTH EVERY DAY - TAKE CONTINUOUSLY, SKIP PLACEBOS, Disp: 28 tablet, Rfl: 0    No Known Allergies    Social History     Socioeconomic History   • Marital status: Single     Spouse name: Not on file   • Number of children: Not on file   • Years of education: Not on file   • Highest education level: Not on file   Occupational History   • Not on file   Tobacco Use   • Smoking status: Never   • Smokeless tobacco: Never   Vaping Use   • Vaping Use: Never used   Substance and Sexual Activity   • Alcohol use: Yes     Comment: occasional   • Drug use: No   • Sexual activity: Yes     Partners: Male     Birth control/protection: Pill   Other Topics Concern   • Not on file   Social History Narrative    Activities: Volleyball    Never drank alcohol - As per [employer]      Social Determinants of Health     Financial Resource Strain: Not on file   Food Insecurity: Not on file   Transportation Needs: Not on file   Physical Activity: Not on file   Stress: Not on file   Social Connections: Not on file   Intimate Partner Violence: Not on file   Housing Stability: Not on file       Review of Systems     ROS:  Constitutional: Negative for fatigue and unexpected weight change  Respiratory: Negative for cough and shortness of breath  Cardiovascular: Negative for chest pain and palpitations  Gastrointestinal: Negative for abdominal pain and change in bowel habits  Breasts:  Negative, other than as noted above  Genitourinary: Negative, other than as noted above  Psychiatric: Negative for mood difficulties  Objective         Vitals:    02/17/23 1049   BP: 102/62       Physical Examination:    Patient appears well and is not in distress  Neck is supple without masses, no cervical or supraclavicular lymphadenopathy  Cardiovascular: regular rate and rhythm; no murmurs  Lungs: clear to auscultation bilaterally; no wheezes  Breasts are symmetrical without mass, tenderness, nipple discharge, skin changes or adenopathy  Abdomen is soft and nontender without masses  External genitals are normal without lesions or rashes  Urethral meatus and urethra are normal  Bladder is normal to palpation  Vagina is normal without discharge or bleeding  Cervix is normal without discharge or lesion  Uterus is normal, mobile, nontender without palpable mass  Adnexa are normal, nontender, without palpable mass

## 2023-02-17 ENCOUNTER — ANNUAL EXAM (OUTPATIENT)
Dept: OBGYN CLINIC | Facility: CLINIC | Age: 28
End: 2023-02-17

## 2023-02-17 VITALS
HEIGHT: 69 IN | DIASTOLIC BLOOD PRESSURE: 62 MMHG | SYSTOLIC BLOOD PRESSURE: 102 MMHG | WEIGHT: 155.2 LBS | BODY MASS INDEX: 22.99 KG/M2

## 2023-02-17 DIAGNOSIS — N94.6 DYSMENORRHEA: ICD-10-CM

## 2023-02-17 DIAGNOSIS — Z01.419 ENCOUNTER FOR GYNECOLOGICAL EXAMINATION (GENERAL) (ROUTINE) WITHOUT ABNORMAL FINDINGS: Primary | ICD-10-CM

## 2023-02-17 RX ORDER — NORETHINDRONE ACETATE AND ETHINYL ESTRADIOL 1MG-20(21)
KIT ORAL
Qty: 112 TABLET | Refills: 2 | Status: SHIPPED | OUTPATIENT
Start: 2023-02-17

## 2023-02-28 LAB
LAB AP GYN PRIMARY INTERPRETATION: NORMAL
Lab: NORMAL
PATH INTERP SPEC-IMP: NORMAL

## 2023-09-22 ENCOUNTER — TELEPHONE (OUTPATIENT)
Age: 28
End: 2023-09-22

## 2023-11-15 DIAGNOSIS — N94.6 DYSMENORRHEA: ICD-10-CM

## 2023-11-15 RX ORDER — NORETHINDRONE ACETATE AND ETHINYL ESTRADIOL 1MG-20(21)
KIT ORAL
Qty: 112 TABLET | Refills: 0 | Status: SHIPPED | OUTPATIENT
Start: 2023-11-15

## 2024-02-03 DIAGNOSIS — N94.6 DYSMENORRHEA: ICD-10-CM

## 2024-02-06 RX ORDER — NORETHINDRONE ACETATE AND ETHINYL ESTRADIOL 1MG-20(21)
KIT ORAL
Qty: 112 TABLET | Refills: 0 | OUTPATIENT
Start: 2024-02-06

## 2024-02-06 NOTE — TELEPHONE ENCOUNTER
She is due for an annual this month and does not have one scheduled. Once an annual has been scheduled I can send more refills   Detail Level: Generalized Detail Level: Detailed

## 2024-05-01 ENCOUNTER — TELEPHONE (OUTPATIENT)
Age: 29
End: 2024-05-01

## 2024-05-01 DIAGNOSIS — N94.6 DYSMENORRHEA: ICD-10-CM

## 2024-05-01 RX ORDER — NORETHINDRONE ACETATE AND ETHINYL ESTRADIOL 1MG-20(21)
KIT ORAL
Qty: 112 TABLET | Refills: 0 | OUTPATIENT
Start: 2024-05-01

## 2024-05-01 RX ORDER — NORETHINDRONE ACETATE AND ETHINYL ESTRADIOL 1MG-20(21)
KIT ORAL
Qty: 112 TABLET | Refills: 0 | Status: SHIPPED | OUTPATIENT
Start: 2024-05-01

## 2024-05-01 RX ORDER — NORETHINDRONE ACETATE AND ETHINYL ESTRADIOL 1MG-20(21)
KIT ORAL
Qty: 112 TABLET | Refills: 0 | Status: SHIPPED | OUTPATIENT
Start: 2024-05-01 | End: 2024-05-01 | Stop reason: SDUPTHER

## 2024-05-01 NOTE — TELEPHONE ENCOUNTER
Patient called stating prescription was sent to wrong pharmacy. Patient would like this sent to [facility]/pharmacy #[id number] [facility] [place] - [address]

## 2024-05-01 NOTE — TELEPHONE ENCOUNTER
Pt called. Pt scheduled annual visit for 06/05. Pt states she is on her last pack of BC. Pt would like refill until upcoming visit.

## 2024-06-05 ENCOUNTER — ANNUAL EXAM (OUTPATIENT)
Dept: OBGYN CLINIC | Facility: CLINIC | Age: 29
End: 2024-06-05
Payer: COMMERCIAL

## 2024-06-05 VITALS
BODY MASS INDEX: 24.46 KG/M2 | DIASTOLIC BLOOD PRESSURE: 72 MMHG | HEIGHT: 68 IN | HEART RATE: 80 BPM | OXYGEN SATURATION: 98 % | WEIGHT: 161.4 LBS | SYSTOLIC BLOOD PRESSURE: 114 MMHG

## 2024-06-05 DIAGNOSIS — Z01.419 WELL WOMAN EXAM WITH ROUTINE GYNECOLOGICAL EXAM: Primary | ICD-10-CM

## 2024-06-05 DIAGNOSIS — N94.6 DYSMENORRHEA: ICD-10-CM

## 2024-06-05 PROCEDURE — S0612 ANNUAL GYNECOLOGICAL EXAMINA: HCPCS | Performed by: PHYSICIAN ASSISTANT

## 2024-06-05 RX ORDER — NORETHINDRONE ACETATE AND ETHINYL ESTRADIOL 1MG-20(21)
KIT ORAL
Qty: 112 TABLET | Refills: 3 | Status: SHIPPED | OUTPATIENT
Start: 2024-06-05

## 2024-06-05 RX ORDER — LEVOCETIRIZINE DIHYDROCHLORIDE 2.5 MG/5ML
2.5 SOLUTION ORAL EVERY EVENING
COMMUNITY

## 2024-06-05 NOTE — PROGRESS NOTES
Assessment   28 y.o.  presenting for annual exam.     Plan   Diagnoses and all orders for this visit:    Well woman exam with routine gynecological exam    Dysmenorrhea  -     norethindrone-ethinyl estradiol (Junel FE 1/20) 1-20 MG-MCG per tablet; TAKE 1 TABLET BY MOUTH EVERY DAY - TAKE CONTINUOUSLY, SKIP PLACEBOS    Other orders  -     levocetirizine (XYZAL) 2.5 MG/5ML solution; Take 2.5 mg by mouth every evening        Pap up to date  Contraception- rx sent to pharmacy for refills; no side effects on current OCP; normotensive today    Perineal hygiene reviewed. Weight bearing exercises minium of 150 mins/weekly advised. Kegel exercises recommended.   SBE encouraged, A yearly mammogram is recommended for breast cancer screening starting at age 40. ASCCP guidelines reviewed. Condoms encouraged with all sexual activity to prevent STI's. Gardisil vaccines recommended up to age 45. Calcium/ Vit D dietary requirements discussed.   Advised to call with any issues, all concerns & questions addressed.   See provided information in your after visit summary     F/U Annually and PRN    Results will be released to [employer], if abnormal will call or message to review and discuss treatment plan.     __________________________________________________________________    Subjective     Zoë Krueger is a 28 y.o.  presenting for annual exam. She is without complaint and does not want STD testing today.     SCREENING  Last Pap: 23 negative      GYN  The patient's menstrual history is as follows:   Menarche Age: 17 years;  ; Period Cycle (Days): 90; Period Duration (Days): 5; Period Pattern: Regular; Menstrual Flow: Moderate; Dysmenorrhea: None;        Sexually active: Yes - single partner - male  Contraception: OCPs  Hx STI: denies     Hx Abnormal pap: denies  We reviewed ASCCP guidelines for Pap testing today.  Gardasil: She has completed the Gardasil series.    Denies vaginal discharge, itching, odor,  dyspareunia, pelvic pain and vulvar/vaginal symptoms      OB           Complaints: denies   Denies urgency, frequency, hematuria, leakage / change in stream, difficulty urinating.       BREAST  Complaints: denies   Denies: breast lump, breast tenderness, nipple discharge, skin color change, and skin lesion(s)  Personal hx: none      Pertinent Family Hx:   Family hx of breast cancer: no  Family hx of ovarian cancer: no  Family hx of colon cancer: no      GENERAL  PMH reviewed/updated and is as below.   Patient does follow with a PCP.    SOCIAL  Smoking: no  Alcohol:social  Drug: no  Occupation:        History reviewed. No pertinent past medical history.    Past Surgical History:   Procedure Laterality Date    WISDOM TOOTH EXTRACTION           Current Outpatient Medications:     levocetirizine (XYZAL) 2.5 MG/5ML solution, Take 2.5 mg by mouth every evening, Disp: , Rfl:     norethindrone-ethinyl estradiol (Junel FE 1/20) 1-20 MG-MCG per tablet, TAKE 1 TABLET BY MOUTH EVERY DAY - TAKE CONTINUOUSLY, SKIP PLACEBOS, Disp: 112 tablet, Rfl: 0    No Known Allergies    Social History     Socioeconomic History    Marital status: Single     Spouse name: Not on file    Number of children: Not on file    Years of education: Not on file    Highest education level: Not on file   Occupational History    Not on file   Tobacco Use    Smoking status: Never    Smokeless tobacco: Never   Vaping Use    Vaping status: Never Used   Substance and Sexual Activity    Alcohol use: Yes     Comment: occasional    Drug use: No    Sexual activity: Yes     Partners: Male     Birth control/protection: Pill   Other Topics Concern    Not on file   Social History Narrative    Activities: Volleyball    Never drank alcohol - As per [employer]      Social Determinants of Health     Financial Resource Strain: Not on file   Food Insecurity: Not on file   Transportation Needs: Not on file   Physical Activity: Not on file   Stress:  "Not on file   Social Connections: Not on file   Intimate Partner Violence: Not on file   Housing Stability: Not on file       Review of Systems     ROS:  Constitutional: Negative for fatigue and unexpected weight change.   Respiratory: Negative for cough and shortness of breath.    Cardiovascular: Negative for chest pain and palpitations.   Gastrointestinal: Negative for abdominal pain and change in bowel habits  Breasts:  Negative, other than as noted above.   Genitourinary: Negative, other than as noted above.   Psychiatric: Negative for mood difficulties.      Objective      /72 (BP Location: Left arm, Patient Position: Sitting, Cuff Size: Standard)   Pulse 80   Ht 5' 7.5\" (1.715 m)   Wt 73.2 kg (161 lb 6.4 oz)   LMP 05/01/2024 (Approximate)   SpO2 98%   BMI 24.91 kg/m²     Physical Examination:    Patient appears well and is not in distress  Neck is supple without masses, no cervical or supraclavicular lymphadenopathy  Cardiovascular: regular rate and rhythm; no murmurs  Lungs: clear to auscultation bilaterally; no wheezes  Breasts are symmetrical without mass, tenderness, nipple discharge, skin changes or adenopathy.   Abdomen is soft and nontender without masses.   External genitals are normal without lesions or rashes.  Urethral meatus and urethra are normal  Bladder is normal to palpation  Vagina is normal without discharge or bleeding.   Cervix is normal without discharge or lesion.   Uterus is normal, mobile, nontender without palpable mass.  Adnexa are normal, nontender, without palpable mass.                 "

## 2024-07-03 ENCOUNTER — TELEPHONE (OUTPATIENT)
Dept: OBGYN CLINIC | Facility: CLINIC | Age: 29
End: 2024-07-03